# Patient Record
Sex: MALE | Race: WHITE | NOT HISPANIC OR LATINO | URBAN - METROPOLITAN AREA
[De-identification: names, ages, dates, MRNs, and addresses within clinical notes are randomized per-mention and may not be internally consistent; named-entity substitution may affect disease eponyms.]

---

## 2022-10-04 ENCOUNTER — DOCUMENTATION (OUTPATIENT)
Dept: PSYCHIATRY | Facility: CLINIC | Age: 37
End: 2022-10-04

## 2022-10-11 ENCOUNTER — DOCUMENTATION (OUTPATIENT)
Dept: BEHAVIORAL/MENTAL HEALTH CLINIC | Facility: CLINIC | Age: 37
End: 2022-10-11

## 2022-10-11 NOTE — PROGRESS NOTES
Assessment/Plan:      There are no diagnoses linked to this encounter  Subjective:     Patient ID: Rod Guillory is a 42 y o  male  Outpatient Discharge Summary:   Admission Date: 4/25/2022  JACLYN was referred by [facility]   Discharge Date: 10/11/2022    Discharge Diagnosis:    No diagnosis found  Treating Physician: ZENY Martinez  Treatment Complications: n/a  Presenting Problem: Depression  Course of treatment includes:    individual therapy   Treatment Progress: poor  Criteria for Discharge: inactivity   Aftercare recommendations include n/a  Discharge Medications include:No current outpatient medications on file      Prognosis: poor

## 2023-10-17 ENCOUNTER — TELEPHONE (OUTPATIENT)
Dept: PSYCHIATRY | Facility: CLINIC | Age: 38
End: 2023-10-17

## 2023-10-17 NOTE — TELEPHONE ENCOUNTER
Rec'd call from clt after writer emailed clt to call intake due to referral from [id number] [place] [address] for [facility] therapy services. t does not have insurance but has applied for MA. [facility] does not live in range for  to be able to provide [facility] funding at this time. Advised clt that he may call us when MA is reinstated or he has the option to do self-pay. He can also go within his catchment area for [place] if he would like. Clt to call back once MA is reinstated.